# Patient Record
(demographics unavailable — no encounter records)

---

## 2024-11-25 NOTE — HISTORY OF PRESENT ILLNESS
[Mother] : mother [Father] : father [Cow's milk (Ounces per day ___)] : consumes [unfilled] oz of Cow's milk per day [Fruit] : fruit [Vegetables] : vegetables [Meat] : meat [Cereal] : cereal [Eggs] : eggs [Baby food] : baby food [Finger Foods] : finger foods [Table food] : table food [Vitamin ___] : Patient takes [unfilled] vitamin daily  [___ stools per day] : [unfilled]  stools per day [Loose] : loose consistency [___ voids per day] : [unfilled] voids per day [Normal] : Normal [In crib] : In crib [Wakes up at night] : Wakes up at night [Pacifier use] : Pacifier use [Sippy cup use] : Sippy cup use [Brushing teeth] : Brushing teeth [Yes] : Patient goes to dentist yearly [Playtime] : Playtime  [Temper Tantrums] : Temper Tantrums [No] : Not at  exposure [Water heater temperature set at <120 degrees F] : Water heater temperature set at <120 degrees F [Car seat in back seat] : Car seat in back seat [Carbon Monoxide Detectors] : Carbon monoxide detectors [Smoke Detectors] : Smoke detectors [Up to date] : Up to date [NO] : No [Exposure to electronic nicotine delivery system] : No exposure to electronic nicotine delivery system [de-identified] : No acute concerns. [de-identified] : Went to dentist last month. Will see dentist again in 4 months. [de-identified] : No flu vaccine today [FreeTextEntry1] : An 18-month-old male with a pmhx of b/l impacted cerumen, iron deficiency anemia, and speech delay presenting for HCM. Parents report he has had a stomach virus that started 11/14. They report he has had diarrhea, a decreased appetite, and a fever. They report today is the first day he is eating and drinking normally. They state they saw ENT a month ago for the cerumen impaction. Mom states they removed the ear wax and that they have a follow-up appointment next week. Mom states she has not given the ferrous sulfate since last week, due to the stomach virus. Mom states he is eating and sleeping at baseline. Parents report he is not saying 6-10 words and only says "papa".   SDOH Screening Questionnaire   SDOH (Social Determinants of Health) Questionnaire: 1. Housing: Do you worry that in the upcoming months, your family, or child, may not have a safe or stable place to live? no 2. Food security: Within the last 12 months, did the food you bought not last and you did not have money to buy more? no 3. Community: Do you need help getting public benefits like food stamps or WIC? no 4. Transportation: Does your child have chronic medical condition and therefore struggle with transportation to attend medical appointments? no 5. Healthcare Access: Do you need help getting health or dental insurance? no       Result: Negative Screen. No further intervention needed.

## 2024-11-25 NOTE — HISTORY OF PRESENT ILLNESS
[Clear Effusion] : clear effusion [NL] : warm, clear [Cerumen in canal] : no cerumen in canal [Discharge in canal] : no discharge in canal [Pain with manipulation of pinna] : no pain with manipulation of pinna [Inflammation of canal] : no inflammation of canal [Erythema of canal] : no erythema of canal [Erythema] : no erythema [Bulging] : not bulging [Purulent Effusion] : no purulent effusion [Mother] : mother [Father] : father [Cow's milk (Ounces per day ___)] : consumes [unfilled] oz of Cow's milk per day [Fruit] : fruit [Vegetables] : vegetables [Meat] : meat [Cereal] : cereal [Eggs] : eggs [Baby food] : baby food [Finger Foods] : finger foods [Table food] : table food [Vitamin ___] : Patient takes [unfilled] vitamin daily  [___ stools per day] : [unfilled]  stools per day [Loose] : loose consistency [___ voids per day] : [unfilled] voids per day [Normal] : Normal [In crib] : In crib [Wakes up at night] : Wakes up at night [Pacifier use] : Pacifier use [Sippy cup use] : Sippy cup use [Brushing teeth] : Brushing teeth [Yes] : Patient goes to dentist yearly [Playtime] : Playtime  [Temper Tantrums] : Temper Tantrums [No] : Not at  exposure [Water heater temperature set at <120 degrees F] : Water heater temperature set at <120 degrees F [Car seat in back seat] : Car seat in back seat [Carbon Monoxide Detectors] : Carbon monoxide detectors [Smoke Detectors] : Smoke detectors [Up to date] : Up to date [NO] : No [Exposure to electronic nicotine delivery system] : No exposure to electronic nicotine delivery system [de-identified] : No acute concerns. [de-identified] : Went to dentist last month. Will see dentist again in 4 months. [de-identified] : No flu vaccine today [FreeTextEntry1] : An 18-month-old male with a pmhx of b/l impacted cerumen, iron deficiency anemia, and speech delay presenting for HCM. Parents report he has had a stomach virus that started 11/14. They report he has had diarrhea, a decreased appetite, and a fever. They report today is the first day he is eating and drinking normally. They state they saw ENT a month ago for the cerumen impaction. Mom states they removed the ear wax and that they have a follow-up appointment next week. Mom states she has not given the ferrous sulfate since last week, due to the stomach virus. Mom states he is eating and sleeping at baseline. Parents report he is not saying 6-10 words and only says "papa".   SDOH Screening Questionnaire   SDOH (Social Determinants of Health) Questionnaire: 1. Housing: Do you worry that in the upcoming months, your family, or child, may not have a safe or stable place to live? no 2. Food security: Within the last 12 months, did the food you bought not last and you did not have money to buy more? no 3. Community: Do you need help getting public benefits like food stamps or WIC? no 4. Transportation: Does your child have chronic medical condition and therefore struggle with transportation to attend medical appointments? no 5. Healthcare Access: Do you need help getting health or dental insurance? no       Result: Negative Screen. No further intervention needed.

## 2024-11-25 NOTE — PHYSICAL EXAM
[Alert] : alert [No Acute Distress] : no acute distress [Crying] : crying [Normocephalic] : normocephalic [Anterior [place] Closed] : anterior fontanelle closed [Red Reflex Bilateral] : red reflex bilateral [PERRL] : PERRL [Normally Placed Ears] : normally placed ears [Auricles Well Formed] : auricles well formed [Clear Tympanic membranes with present light reflex and bony landmarks] : clear tympanic membranes with present light reflex and bony landmarks [No Discharge] : no discharge [Nares Patent] : nares patent [Palate Intact] : palate intact [Uvula Midline] : uvula midline [Tooth Eruption] : tooth eruption  [Supple, full passive range of motion] : supple, full passive range of motion [No Palpable Masses] : no palpable masses [Symmetric Chest Rise] : symmetric chest rise [Clear to Auscultation Bilaterally] : clear to auscultation bilaterally [Regular Rate and Rhythm] : regular rate and rhythm [S1, S2 present] : S1, S2 present [No Murmurs] : no murmurs [+2 Femoral Pulses] : +2 femoral pulses [Soft] : soft [NonTender] : non tender [Non Distended] : non distended [No Hepatomegaly] : no hepatomegaly [No Splenomegaly] : no splenomegaly [Central Urethral Opening] : central urethral opening [Testicles Descended Bilaterally] : testicles descended bilaterally [Patent] : patent [Normally Placed] : normally placed [No Abnormal Lymph Nodes Palpated] : no abnormal lymph nodes palpated [No Clavicular Crepitus] : no clavicular crepitus [Symmetric Buttocks Creases] : symmetric buttocks creases [No Spinal Dimple] : no spinal dimple [NoTuft of Hair] : no tuft of hair [Cranial Nerves Grossly Intact] : cranial nerves grossly intact [No Rash or Lesions] : no rash or lesions [FreeTextEntry9] : Hyperactive bowel sounds noted.

## 2024-11-25 NOTE — REVIEW OF SYSTEMS
[Irritable] : irritability [Fussy] : fussy [Intolerance to feeds] : intolerance to feeds [Diarrhea] : diarrhea [Negative] : Genitourinary [Crying] : crying [Eye Discharge] : no eye discharge [Eye Redness] : no eye redness [Nasal Congestion] : no nasal congestion [Wheezing] : no wheezing [Cough] : no cough [Spitting Up] : no spitting up [Constipation] : no constipation [Vomiting] : no vomiting [Gaseous] : not gaseous [Restriction of Motion] : no restriction of motion

## 2024-11-25 NOTE — PHYSICAL EXAM
[Alert] : alert [No Acute Distress] : no acute distress [Crying] : crying [Normocephalic] : normocephalic [Anterior [place] Closed] : anterior fontanelle closed [Red Reflex Bilateral] : red reflex bilateral [PERRL] : PERRL [Normally Placed Ears] : normally placed ears [Auricles Well Formed] : auricles well formed [Clear Tympanic membranes with present light reflex and bony landmarks] : clear tympanic membranes with present light reflex and bony landmarks [No Discharge] : no discharge [Nares Patent] : nares patent [Palate Intact] : palate intact [Uvula Midline] : uvula midline [Tooth Eruption] : tooth eruption  [Supple, full passive range of motion] : supple, full passive range of motion [No Palpable Masses] : no palpable masses [Symmetric Chest Rise] : symmetric chest rise [Clear to Auscultation Bilaterally] : clear to auscultation bilaterally [Regular Rate and Rhythm] : regular rate and rhythm [S1, S2 present] : S1, S2 present [No Murmurs] : no murmurs [+2 Femoral Pulses] : +2 femoral pulses [Soft] : soft [NonTender] : non tender [Non Distended] : non distended [No Hepatomegaly] : no hepatomegaly [No Splenomegaly] : no splenomegaly [Central Urethral Opening] : central urethral opening [Testicles Descended Bilaterally] : testicles descended bilaterally [Patent] : patent [Normally Placed] : normally placed [No Abnormal Lymph Nodes Palpated] : no abnormal lymph nodes palpated [No Clavicular Crepitus] : no clavicular crepitus [Symmetric Buttocks Creases] : symmetric buttocks creases [No Spinal Dimple] : no spinal dimple [NoTuft of Hair] : no tuft of hair [Cranial Nerves Grossly Intact] : cranial nerves grossly intact [No Rash or Lesions] : no rash or lesions [FreeTextEntry9] : Hyperactive bowel sounds noted.

## 2024-11-25 NOTE — DEVELOPMENTAL MILESTONES
[None] : none [Engages with others for play] : engages with others for play [Help dress and undress self] : help dress and undress self [Points to pictures in book] : points to pictures in book [Points to object of interest to] : points to object of interest to draw attention to it [Turns and looks at adult if] : turns and looks at adult if something new happens [Begins to scoop with spoon] : begins to scoop with spoon [Walks up with 2 feet per step] : walks up with 2 feet per step with hand held [Sits in small chair] : sits in small chair [Carries toy while walking] : carries toy while walking [Scribbles spontaneously] : scribbles spontaneously [Throws small ball a few feet] : throws a small ball a few feet while standing [Not Passed] : not passed [Uses 6 to 10 words other than] : does not use 6 to 10 words other than names [Identifies at least 2 body parts] : does not indentify at least 2 body parts [FreeTextEntry1] : Medium-risk

## 2024-11-25 NOTE — DISCUSSION/SUMMARY
[Normal Growth] : growth [Normal Development] : development [None] : No known medical problems [No Elimination Concerns] : elimination [No Feeding Concerns] : feeding [No Skin Concerns] : skin [Normal Sleep Pattern] : sleep [Family Support] : family support [Child Development and Behavior] : child development and behavior [Language Promotion/Hearing] : language promotion/hearing [[place] Training Readiness] : toliet training readiness [Safety] : safety [No Medications] : ~He/She~ is not on any medications [Parent/Guardian] : parent/guardian [] : The components of the vaccine(s) to be administered today are listed in the plan of care. The disease(s) for which the vaccine(s) are intended to prevent and the risks have been discussed with the caretaker.  The risks are also included in the appropriate vaccination information statements which have been provided to the patient's caregiver.  The caregiver has given consent to vaccinate. [FreeTextEntry1] : An 18-month-old male with a pmhx of b/l impacted cerumen, iron deficiency anemia, and speech delay presenting for HCM. Growth normal. Patient is speech delayed, referral for early intervention provided. PE remarkable for hyperactive bowel sounds, consistent with gastroenteritis. MCHAT screening showed medium-risk. Immunizations UTD. Parents refused flu vaccine.   - Routine care and anticipatory guidance given  - F/u CBC and lead - Vaccines given: Hep A - Post vaccine care discussed, and potential side effects discussed - Tylenol every 4 hours prn or Motrin every 6 hours prn for pain or fever.  - Choking hazards reviewed  - Rx refill Ferrous Sulfate - Early intervention referral provided for speech delay  - Discussed STRICT precautions regarding for seeking medical attention, including but not limited to fever of 100.4F or more, poor feeding, lethargy or decreased responsiveness, fast or labored breathing, less than 5 wet diapers daily, rash, or any other concerning signs or symptoms.   Caretakers expressed understanding of the plan and agrees. All questions were answered.